# Patient Record
Sex: FEMALE | Race: WHITE | NOT HISPANIC OR LATINO | Employment: FULL TIME | URBAN - METROPOLITAN AREA
[De-identification: names, ages, dates, MRNs, and addresses within clinical notes are randomized per-mention and may not be internally consistent; named-entity substitution may affect disease eponyms.]

---

## 2022-04-11 ENCOUNTER — OFFICE VISIT (OUTPATIENT)
Dept: URGENT CARE | Facility: CLINIC | Age: 42
End: 2022-04-11
Payer: COMMERCIAL

## 2022-04-11 VITALS
HEIGHT: 65 IN | TEMPERATURE: 97.5 F | HEART RATE: 115 BPM | SYSTOLIC BLOOD PRESSURE: 140 MMHG | DIASTOLIC BLOOD PRESSURE: 100 MMHG | OXYGEN SATURATION: 96 % | BODY MASS INDEX: 31.16 KG/M2 | WEIGHT: 187 LBS | RESPIRATION RATE: 18 BRPM

## 2022-04-11 DIAGNOSIS — M54.41 ACUTE BILATERAL LOW BACK PAIN WITH RIGHT-SIDED SCIATICA: ICD-10-CM

## 2022-04-11 PROCEDURE — 99204 OFFICE O/P NEW MOD 45 MIN: CPT | Performed by: NURSE PRACTITIONER

## 2022-04-11 RX ORDER — KETOROLAC TROMETHAMINE 30 MG/ML
15 INJECTION, SOLUTION INTRAMUSCULAR; INTRAVENOUS ONCE
Status: COMPLETED | OUTPATIENT
Start: 2022-04-11 | End: 2022-04-11

## 2022-04-11 RX ORDER — METHYLPREDNISOLONE 4 MG/1
TABLET ORAL
Qty: 21 TABLET | Refills: 0 | Status: SHIPPED | OUTPATIENT
Start: 2022-04-11

## 2022-04-11 RX ORDER — CYCLOBENZAPRINE HCL 5 MG
5 TABLET ORAL 3 TIMES DAILY PRN
Qty: 30 TABLET | Refills: 0 | Status: SHIPPED | OUTPATIENT
Start: 2022-04-11

## 2022-04-11 RX ADMIN — KETOROLAC TROMETHAMINE 15 MG: 30 INJECTION, SOLUTION INTRAMUSCULAR; INTRAVENOUS at 19:19

## 2022-04-11 ASSESSMENT — ENCOUNTER SYMPTOMS
FEVER: 0
PARESIS: 0
WEIGHT LOSS: 0
SORE THROAT: 0
EYE PAIN: 0
TINGLING: 0
SHORTNESS OF BREATH: 0
DIZZINESS: 0
MYALGIAS: 0
BOWEL INCONTINENCE: 0
HEADACHES: 0
BACK PAIN: 1
PERIANAL NUMBNESS: 0
CHILLS: 0
NAUSEA: 0
LEG PAIN: 1
VOMITING: 0

## 2022-04-11 NOTE — LETTER
April 11, 2022         Patient: Anel Natarajan   YOB: 1980   Date of Visit: 4/11/2022           To Whom it May Concern:    Anel Natarajan was seen in my clinic on 4/11/2022. She may return to work on 4/15/22.    If you have any questions or concerns, please don't hesitate to call.        Sincerely,           PRATIK Hawkins.  Electronically Signed

## 2022-04-12 NOTE — PROGRESS NOTES
Subjective:   Anel Natarajan is a 41 y.o. female who presents for Low Back Pain (Hip pain, sciatica pain, radiates (R) leg into toes, and locks toes. Needs note for work today and tomorrow. X yesterday )      Back Pain  This is a new problem. The current episode started in the past 7 days. The problem occurs constantly. The problem has been gradually worsening since onset. The pain is present in the lumbar spine. The quality of the pain is described as burning and shooting. The pain does not radiate. The pain is at a severity of 10/10. The pain is severe. The pain is the same all the time. The symptoms are aggravated by twisting, standing, position and bending. Associated symptoms include leg pain. Pertinent negatives include no bladder incontinence, bowel incontinence, chest pain, fever, headaches, paresis, pelvic pain, perianal numbness, tingling or weight loss. She has tried analgesics and NSAIDs for the symptoms. The treatment provided no relief.       Review of Systems   Constitutional: Negative for chills, fever and weight loss.   HENT: Negative for sore throat.    Eyes: Negative for pain.   Respiratory: Negative for shortness of breath.    Cardiovascular: Negative for chest pain.   Gastrointestinal: Negative for bowel incontinence, nausea and vomiting.   Genitourinary: Negative for bladder incontinence, hematuria and pelvic pain.   Musculoskeletal: Positive for back pain. Negative for myalgias.   Skin: Negative for rash.   Neurological: Negative for dizziness, tingling and headaches.       Medications:    • cyclobenzaprine  • ketorolac  • methylPREDNISolone Tbpk  • MOTRIN PO    Allergies: Patient has no known allergies.    Problem List: Anel Natarajan does not have a problem list on file.    Surgical History:  No past surgical history on file.    Past Social Hx: Anel Natarajan  reports that she has never smoked. She has never used smokeless tobacco. She reports that she does not drink alcohol  "and does not use drugs.     Past Family Hx:  Anel Natarajan family history is not on file.     Problem list, medications, and allergies reviewed by myself today in Epic.     Objective:     /100   Pulse (!) 115   Temp 36.4 °C (97.5 °F)   Resp 18   Ht 1.651 m (5' 5\")   Wt 84.8 kg (187 lb)   SpO2 96%   BMI 31.12 kg/m²     Physical Exam  Vitals and nursing note reviewed.   Constitutional:       General: She is not in acute distress.     Appearance: She is well-developed.   HENT:      Head: Normocephalic and atraumatic.      Right Ear: External ear normal.      Left Ear: External ear normal.      Nose: Nose normal.      Mouth/Throat:      Mouth: Mucous membranes are moist.   Eyes:      Conjunctiva/sclera: Conjunctivae normal.   Cardiovascular:      Rate and Rhythm: Normal rate.   Pulmonary:      Effort: Pulmonary effort is normal. No respiratory distress.      Breath sounds: Normal breath sounds.   Abdominal:      General: There is no distension.   Musculoskeletal:      Cervical back: Normal.      Thoracic back: Normal.      Lumbar back: Spasms and tenderness present. No swelling, deformity, signs of trauma, lacerations or bony tenderness. Decreased range of motion. Positive right straight leg raise test.   Skin:     General: Skin is warm and dry.   Neurological:      General: No focal deficit present.      Mental Status: She is alert and oriented to person, place, and time. Mental status is at baseline.      Gait: Gait (gait at baseline) normal.   Psychiatric:         Judgment: Judgment normal.         Assessment/Plan:     Diagnosis and associated orders:     1. Acute bilateral low back pain with right-sided sciatica  ketorolac (TORADOL) injection 15 mg    methylPREDNISolone (MEDROL DOSEPAK) 4 MG Tablet Therapy Pack    cyclobenzaprine (FLEXERIL) 5 mg tablet    Referral to Spine Surgery        Comments/MDM:     Patient is a 41-year-old female present with the stated above, patient having no acute trauma " or injury to the low back, x-ray imaging does not appear to be indicated at this time.  She does have some radicular pain place referral for follow-up for further evaluation and management.  Did recommend to avoid bending, stooping, heavy or repetitive lifting until symptoms resolve.  Avoid prolonged sitting; frequent changes of position may be helpful.  Begin gentle stretching before activity when tolerated.   Pt instructed to take Flexeril only while not at work or while driving. PT instructed not to drive or operate heavy machinery while taking this medication as it causes drowsiness.  PT also instructed not to drink alcohol while taking this medication because it contains benzodiazepines.  PT verbalized understanding of these instructions.   I personally reviewed prior external notes and prior test results pertinent to today's visit.   Discussed management options, risks and benefits, and alternatives to treatment plan agreed upon.   Red flags discussed and indications to immediately call 911 or present to the Emergency Department.   Supportive care, differential diagnoses, and indications for immediate follow-up discussed with patient.    Patient expresses understanding and agrees to plan. Patient denies any other questions or concerns.              Please note that this dictation was created using voice recognition software. I have made a reasonable attempt to correct obvious errors, but I expect that there are errors of grammar and possibly content that I did not discover before finalizing the note.    This note was electronically signed by Devonte FERGUSON.

## 2023-02-15 ENCOUNTER — OFFICE VISIT (OUTPATIENT)
Dept: URGENT CARE | Facility: CLINIC | Age: 43
End: 2023-02-15
Payer: COMMERCIAL

## 2023-02-15 VITALS
DIASTOLIC BLOOD PRESSURE: 84 MMHG | HEIGHT: 66 IN | RESPIRATION RATE: 14 BRPM | OXYGEN SATURATION: 97 % | HEART RATE: 102 BPM | BODY MASS INDEX: 31.45 KG/M2 | SYSTOLIC BLOOD PRESSURE: 118 MMHG | TEMPERATURE: 98.5 F | WEIGHT: 195.7 LBS

## 2023-02-15 DIAGNOSIS — J02.9 SORE THROAT: ICD-10-CM

## 2023-02-15 DIAGNOSIS — R00.0 TACHYCARDIA: ICD-10-CM

## 2023-02-15 DIAGNOSIS — J02.0 ACUTE STREPTOCOCCAL PHARYNGITIS: ICD-10-CM

## 2023-02-15 LAB
INT CON NEG: NEGATIVE
INT CON POS: POSITIVE
S PYO AG THROAT QL: POSITIVE

## 2023-02-15 PROCEDURE — 99214 OFFICE O/P EST MOD 30 MIN: CPT | Performed by: FAMILY MEDICINE

## 2023-02-15 PROCEDURE — 87880 STREP A ASSAY W/OPTIC: CPT | Performed by: FAMILY MEDICINE

## 2023-02-15 RX ORDER — AMOXICILLIN 500 MG/1
500 CAPSULE ORAL 2 TIMES DAILY
Qty: 20 CAPSULE | Refills: 0 | Status: SHIPPED | OUTPATIENT
Start: 2023-02-15 | End: 2023-02-25

## 2023-02-15 NOTE — LETTER
February 15, 2023    To Whom It May Concern:         This is confirmation that Anel Natarajan attended her scheduled appointment with Martha Syed M.D. on 2/15/23. She may return to work on Monday without any restrictions.          If you have any questions please do not hesitate to call me at the phone number listed below.    Sincerely,          Martha Syed M.D.  [phone]

## 2023-02-16 NOTE — PROGRESS NOTES
"  Subjective:      42 y.o. female presents to urgent care for cold ymptoms that started on Saturday. She is experiencing sore throat, runny nose, cough, body aches, and diarrhea. No headaches or fever. Heart rate is elevated today in urgent care. No chest pain, palpitations, or sob.  She denies any tobacco product use. No history of asthma or COPD. She is fully vaccinated against COVID. She had a negative home COVID test this morning. Two of her kids currently have strep throat.    She denies any other questions or concerns at this time.    Current problem list, medication, and past medical/surgical history were reviewed in Epic.    ROS  See HPI     Objective:      /84   Pulse (!) 102   Temp 36.9 °C (98.5 °F) (Temporal)   Resp 14   Ht 1.676 m (5' 6\")   Wt 88.8 kg (195 lb 11.2 oz)   LMP 01/31/2023   SpO2 97%   BMI 31.59 kg/m²     Physical Exam  Constitutional:       General: She is not in acute distress.     Appearance: She is not diaphoretic.   HENT:      Right Ear: Tympanic membrane, ear canal and external ear normal.      Left Ear: Tympanic membrane, ear canal and external ear normal.      Mouth/Throat:      Tongue: Tongue does not deviate from midline.      Palate: No lesions.      Pharynx: Uvula midline. Posterior oropharyngeal erythema present.      Tonsils: Tonsillar exudate present. 1+ on the right. 1+ on the left.   Cardiovascular:      Rate and Rhythm: Regular rhythm. Tachycardia present.      Heart sounds: Normal heart sounds.   Pulmonary:      Effort: Pulmonary effort is normal. No respiratory distress.      Breath sounds: Normal breath sounds.   Neurological:      Mental Status: She is alert.   Psychiatric:         Mood and Affect: Affect normal.         Judgment: Judgment normal.     Assessment/Plan:     1. Acute streptococcal pharyngitis  2. Sore throat  3. Tachycardia  Systemic symptoms seen through tachycardia.  This is asymptomatic.  Rapid strep is positive.  Prescription for " amoxicillin has been sent. Tylenol, ibuprofen, and gargle warm salt water as needed for symptomatic relief.  - POCT Rapid Strep A  - amoxicillin (AMOXIL) 500 MG Cap; Take 1 Capsule by mouth 2 times a day for 10 days.  Dispense: 20 Capsule; Refill: 0      Instructed to return to Urgent Care or nearest Emergency Department if symptoms fail to improve, for any change in condition, further concerns, or new concerning symptoms. Patient states understanding of the plan of care and discharge instructions.    Martha Syed M.D.